# Patient Record
Sex: FEMALE | Race: WHITE | Employment: FULL TIME | URBAN - METROPOLITAN AREA
[De-identification: names, ages, dates, MRNs, and addresses within clinical notes are randomized per-mention and may not be internally consistent; named-entity substitution may affect disease eponyms.]

---

## 2017-10-09 RX ORDER — PHENYTOIN SODIUM 100 MG/1
CAPSULE, EXTENDED RELEASE ORAL
Qty: 40 CAPSULE | Refills: 0 | Status: SHIPPED | OUTPATIENT
Start: 2017-10-09 | End: 2018-01-17 | Stop reason: SDUPTHER

## 2017-10-15 PROBLEM — G40.909 SEIZURE DISORDER (HCC): Status: ACTIVE | Noted: 2017-10-15

## 2017-10-16 ENCOUNTER — OFFICE VISIT (OUTPATIENT)
Dept: NEUROLOGY | Age: 62
End: 2017-10-16
Payer: COMMERCIAL

## 2017-10-16 VITALS
HEIGHT: 66 IN | WEIGHT: 205.8 LBS | SYSTOLIC BLOOD PRESSURE: 113 MMHG | DIASTOLIC BLOOD PRESSURE: 65 MMHG | HEART RATE: 70 BPM | BODY MASS INDEX: 33.07 KG/M2

## 2017-10-16 DIAGNOSIS — G40.909 SEIZURE DISORDER (HCC): Primary | ICD-10-CM

## 2017-10-16 PROCEDURE — 99213 OFFICE O/P EST LOW 20 MIN: CPT | Performed by: NURSE PRACTITIONER

## 2017-10-16 NOTE — PROGRESS NOTES
[facility] NEUROLOGY SPECIALIST  [facility] [id number]  Dept: [phone]  Dept Fax: [phone]  Dillan Chavez CNP    10/16/2017    HPI:      Your patient, Bharathi Dos Santos returns for continuing neurologic care for seizure disorder. Patient is a 72-year-old woman seen in the past by Dr. Tevin Howard for seizure disorder. Patient was last seen in September 2016. 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 She has had no seizures,with her last seizure being in 1989. She tolerates Dilantin well she currently takes 200 mg twice daily.      Testing:  -MRI of the brain normal  -EEG normal      Past Medical History:   Diagnosis Date    Allergic rhinitis 5/26/2015    Hypersomnia with sleep apnea, unspecified     Poison ivy 5/26/2015    Seizures ([address]     Vitreous floaters          Past Surgical History:   Procedure Laterality Date    TONSILLECTOMY      TUBAL LIGATION         Family History   Problem Relation Age of Onset    Hypertension Mother     Hypertension Father    [facility] Lupus Sister        Social History   Substance Use Topics    Smoking status: Former Smoker     Quit date: 11/13/1984    Smokeless tobacco: Never Used      Comment: was a social smoker,  was a smoker, he quit 5 years ago    Alcohol use Yes      Comment: occasionally                               REVIEW OF SYSTEMS    CONSTITUTIONAL Weight: absent, Appetite: absent, Fatigue: absent      HEENT Ears: normal, Eyes: glasses, Visual disturbance: absent   RESPIRATORY Shortness of breath: absent, Cough: absent   CARDIOVASCULAR Chest pain: absent, Leg swelling :absent      GI Constipation: absent, Diarrhea: absent, Swallowing change: absent       Urinary frequency: absent, Urinary urgency: absent, Urinary incontinence: absent   MUSCULOSKELETAL Neck pain: absent, Back pain: absent, Stiffness: absent, Muscle pain: absent, Joint pain: absent Restless legs: absent   DERMATOLOGIC Hair loss: absent, Skin changes: absent   NEUROLOGIC Memory loss: absent, Confusion: absent, Seizures: present Trouble walking or imbalance: absent, Dizziness: absent, Weakness: absent, Numbness: absent Tremor: absent, Spasm: absent, Speech difficulty: absent, Headache: absent, Light sensitivity: absent   PSYCHIATRIC Anxiety: absent, Hallucination: absent, Mood disorder: absent   HEMATOLOGIC Abnormal bleeding: absent, Anemia: absent, Clotting disorder: absent, Lymph gland changes: absent           Allergies   Allergen Reactions    Pollen Extract      sneezing             Current Outpatient Prescriptions   Medication Sig Dispense Refill    DILANTIN 100 MG ER capsule TAKE 2 CAPSULES TWICE A DAY - MUST BE BRAND NAME ONLY 40 capsule 0    Multiple Vitamin (MULTIVITAMIN PO) Take  by mouth daily. No current facility-administered medications for this visit. PHYSICAL EXAMINATION       There were no vitals filed for this visit.                                            .                                                                                                    General Appearance:  Alert, cooperative, no signs of distress, appears stated age   Head:  Normocephalic, no signs of trauma   Eyes:  Conjunctiva/corneas clear;  eyelids intact   Ears:  Normal external ear and canals   Nose: Nares normal, mucosa normal, no drainage    Throat: Lips and tongue normal; teeth normal;  gums normal   Neck: Supple, intact flexion, extension and rotation;   trachea midline;  no adenopathy;   thyroid: not enlarged;   no carotid pulse abnormality   Back:   Symmetric, no curvature, ROM adequate   Lungs:   Respirations unlabored   Heart:  Regular rate and rhythm           Extremities: Extremities normal, no cyanosis, no edema   Pulses: Symmetric over head and neck   Skin: Skin color, texture normal, no rashes, no lesions                                             NEUROLOGIC EXAMINATION    Neurologic Exam     Mental Status   Oriented to person, place, and time. Attention: normal.   Speech: speech is normal   Level of consciousness: alert  Normal comprehension. Cranial Nerves     CN II   Visual fields full to confrontation. CN III, IV, VI   Pupils are equal, round, and reactive to light. Extraocular motions are normal.     CN V   Facial sensation intact. CN VII   Facial expression full, symmetric. CN VIII   CN VIII normal.     CN IX, X   CN IX normal.     CN XI   CN XI normal.     CN XII   CN XII normal.     Motor Exam   Muscle bulk: normal  Overall muscle tone: normal  Right arm pronator drift: absent    Strength   Strength 5/5 throughout. Sensory Exam   Light touch normal.   Vibration normal.   Pinprick normal.     Gait, Coordination, and Reflexes     Gait  Gait: normal    Coordination   Finger to nose coordination: normal    Tremor   Resting tremor: absent    Reflexes   Right brachioradialis: 2+  Left brachioradialis: 2+  Right biceps: 2+  Left biceps: 2+  Right triceps: 2+  Left triceps: 2+  Right patellar: 2+  Left patellar: 2+  Right achilles: 2+  Left achilles: 2+  Right plantar: normal  Left plantar: normal            ASSESSMENT/PLAN:       In summary, your patient, Ankit Green exhibits the following, with associated plan:    1. Idiopathic epilepsy, well controlled with Dilantin 200 mg twice daily  1. Continue Dilantin 200 mg twice daily  2. We will obtain a Dilantin level  3.  Patient will return in follow-up in 1 year, unless problems arise            Signed: Rhonda Arriaga CNP

## 2017-10-16 NOTE — LETTER
Urinary frequency: absent, Urinary urgency: absent, Urinary incontinence: absent   MUSCULOSKELETAL Neck pain: absent, Back pain: absent, Stiffness: absent, Muscle pain: absent, Joint pain: absent Restless legs: absent   DERMATOLOGIC Hair loss: absent, Skin changes: absent   NEUROLOGIC Memory loss: absent, Confusion: absent, Seizures: present Trouble walking or imbalance: absent, Dizziness: absent, Weakness: absent, Numbness: absent Tremor: absent, Spasm: absent, Speech difficulty: absent, Headache: absent, Light sensitivity: absent   PSYCHIATRIC Anxiety: absent, Hallucination: absent, Mood disorder: absent   HEMATOLOGIC Abnormal bleeding: absent, Anemia: absent, Clotting disorder: absent, Lymph gland changes: absent           Allergies   Allergen Reactions    Pollen Extract      sneezing             Current Outpatient Prescriptions   Medication Sig Dispense Refill    DILANTIN 100 MG ER capsule TAKE 2 CAPSULES TWICE A DAY - MUST BE BRAND NAME ONLY 40 capsule 0    Multiple Vitamin (MULTIVITAMIN PO) Take  by mouth daily. No current facility-administered medications for this visit. PHYSICAL EXAMINATION       There were no vitals filed for this visit.                                            .                                                                                                    General Appearance:  Alert, cooperative, no signs of distress, appears stated age   Head:  Normocephalic, no signs of trauma   Eyes:  Conjunctiva/corneas clear;  eyelids intact   Ears:  Normal external ear and canals   Nose: Nares normal, mucosa normal, no drainage    Throat: Lips and tongue normal; teeth normal;  gums normal   Neck: Supple, intact flexion, extension and rotation;   trachea midline;  no adenopathy;   thyroid: not enlarged;   no carotid pulse abnormality   Back:   Symmetric, no curvature, ROM adequate   Lungs:   Respirations unlabored Claribel Meek, CNP

## 2018-01-16 DIAGNOSIS — G40.909 SEIZURE DISORDER (HCC): Primary | ICD-10-CM

## 2018-01-16 RX ORDER — PHENYTOIN SODIUM 100 MG/1
CAPSULE, EXTENDED RELEASE ORAL
Qty: 120 CAPSULE | Refills: 5 | Status: SHIPPED | OUTPATIENT
Start: 2018-01-16 | End: 2018-01-17 | Stop reason: CLARIF

## 2018-01-18 RX ORDER — PHENYTOIN SODIUM 100 MG/1
200 CAPSULE, EXTENDED RELEASE ORAL 2 TIMES DAILY
Qty: 40 CAPSULE | Refills: 0 | Status: SHIPPED | OUTPATIENT
Start: 2018-01-18 | End: 2018-10-16 | Stop reason: SDUPTHER

## 2018-01-18 RX ORDER — PHENYTOIN SODIUM 100 MG/1
200 CAPSULE, EXTENDED RELEASE ORAL 2 TIMES DAILY
Qty: 360 CAPSULE | Refills: 1 | Status: SHIPPED | OUTPATIENT
Start: 2018-01-18 | End: 2018-07-22 | Stop reason: SDUPTHER

## 2018-07-22 DIAGNOSIS — G40.909 SEIZURE DISORDER (HCC): ICD-10-CM

## 2018-07-23 RX ORDER — PHENYTOIN SODIUM 100 MG/1
CAPSULE, EXTENDED RELEASE ORAL
Qty: 360 CAPSULE | Refills: 0 | Status: SHIPPED | OUTPATIENT
Start: 2018-07-23 | End: 2019-04-23 | Stop reason: SDUPTHER

## 2018-10-16 ENCOUNTER — OFFICE VISIT (OUTPATIENT)
Dept: NEUROLOGY | Age: 63
End: 2018-10-16
Payer: COMMERCIAL

## 2018-10-16 VITALS
DIASTOLIC BLOOD PRESSURE: 75 MMHG | HEART RATE: 75 BPM | BODY MASS INDEX: 33.55 KG/M2 | HEIGHT: 65 IN | WEIGHT: 201.4 LBS | SYSTOLIC BLOOD PRESSURE: 117 MMHG

## 2018-10-16 DIAGNOSIS — G40.909 SEIZURE DISORDER (HCC): ICD-10-CM

## 2018-10-16 PROCEDURE — 1036F TOBACCO NON-USER: CPT | Performed by: NURSE PRACTITIONER

## 2018-10-16 PROCEDURE — G8484 FLU IMMUNIZE NO ADMIN: HCPCS | Performed by: NURSE PRACTITIONER

## 2018-10-16 PROCEDURE — G8417 CALC BMI ABV UP PARAM F/U: HCPCS | Performed by: NURSE PRACTITIONER

## 2018-10-16 PROCEDURE — 3017F COLORECTAL CA SCREEN DOC REV: CPT | Performed by: NURSE PRACTITIONER

## 2018-10-16 PROCEDURE — 99213 OFFICE O/P EST LOW 20 MIN: CPT | Performed by: NURSE PRACTITIONER

## 2018-10-16 PROCEDURE — G8427 DOCREV CUR MEDS BY ELIG CLIN: HCPCS | Performed by: NURSE PRACTITIONER

## 2018-10-16 RX ORDER — PHENYTOIN SODIUM 100 MG/1
200 CAPSULE, EXTENDED RELEASE ORAL 2 TIMES DAILY
Qty: 360 CAPSULE | Refills: 3 | Status: SHIPPED | OUTPATIENT
Start: 2018-10-16 | End: 2019-04-22 | Stop reason: SDUPTHER

## 2018-10-16 NOTE — PROGRESS NOTES
occasionally                               REVIEW OF SYSTEMS    CONSTITUTIONAL Weight: absent, Appetite: absent, Fatigue: absent      HEENT Ears: normal, Visual disturbance: absent   RESPIRATORY Shortness of breath: absent, Cough: absent   CARDIOVASCULAR Chest pain: absent, Leg swelling :absent      GI Constipation: absent, Diarrhea: absent, Swallowing change: absent       Urinary frequency: absent, Urinary urgency: absent, Urinary incontinence: absent   MUSCULOSKELETAL Neck pain: absent, Back pain: absent, Stiffness: absent, Muscle pain: absent, Joint pain: absent Restless legs: absent   DERMATOLOGIC Hair loss: absent, Skin changes: absent   NEUROLOGIC Memory loss: absent, Confusion: absent, Seizures: absent Trouble walking or imbalance: absent, Dizziness: absent, Weakness: absent, Numbness: absent Tremor: absent, Spasm: absent, Speech difficulty: absent, Headache: absent, Light sensitivity: absent   PSYCHIATRIC Anxiety: absent, Hallucination: absent, Mood disorder: absent   HEMATOLOGIC Abnormal bleeding: absent, Anemia: absent, Clotting disorder: absent, Lymph gland changes: absent           Allergies   Allergen Reactions    Pollen Extract      sneezing             Current Outpatient Prescriptions   Medication Sig Dispense Refill    phenytoin (DILANTIN) 100 MG ER capsule Take 2 capsules by mouth 2 times daily MUST BE BRAND NAME ONLY, DO NOT FILL GENERIC DUE TO INTOLERANCE. 360 capsule 3    DILANTIN 100 MG ER capsule TAKE 2 CAPSULES 2 TIMES DAILY 360 capsule 0    Multiple Vitamin (MULTIVITAMIN PO) Take  by mouth daily. No current facility-administered medications for this visit. PHYSICAL EXAMINATION       /75 (Site: Left Upper Arm, Position: Sitting)   Pulse 75   Ht 5' 5\" (1.651 m)   Wt 201 lb 6.4 oz (91.4 kg)   BMI 33.51 kg/m²                                             . General Appearance:  Alert, cooperative, no signs of distress, appears stated age   Head:  Normocephalic, no signs of trauma   Eyes:  Conjunctiva/corneas clear;  eyelids intact   Ears:  Normal external ear and canals   Nose: Nares normal, mucosa normal, no drainage    Throat: Lips and tongue normal; teeth normal;  gums normal   Neck: Supple, intact flexion, extension and rotation;   trachea midline;  no adenopathy;   thyroid: not enlarged;   no carotid pulse abnormality   Back:   Symmetric, no curvature, ROM adequate   Lungs:   Respirations unlabored   Heart:  Regular rate and rhythm           Extremities: Extremities normal, no cyanosis, no edema   Pulses: Symmetric over head and neck   Skin: Skin color, texture normal, no rashes, no lesions                                             NEUROLOGIC EXAMINATION    Neurologic Exam          ASSESSMENT/PLAN:       In summary, your patient, Jolynn Velazco exhibits the following, with associated plan:    1. Partial onset seizure disorder with secondary generalization. Patient has been seizure-free since 1989.  1. Continue Dilantin 200 mg twice daily  2. Obtain phenytoin level  3. Obtain ALT, AST, and CBC  4.  Continue to return on an annual basis            Signed: Jillian Dean CNP      *Please note that portions of this note were completed with a voice recognition program.  Although every effort was made to insure the accuracy of this automated transcription, some errors in transcription may have occurred, occasionally words and are mis-transcribed

## 2019-04-22 RX ORDER — PHENYTOIN SODIUM 100 MG/1
200 CAPSULE, EXTENDED RELEASE ORAL 2 TIMES DAILY
Qty: 360 CAPSULE | Refills: 1 | Status: SHIPPED | OUTPATIENT
Start: 2019-04-22 | End: 2019-04-23 | Stop reason: CLARIF

## 2019-04-23 DIAGNOSIS — G40.909 SEIZURE DISORDER (HCC): ICD-10-CM

## 2019-04-23 RX ORDER — PHENYTOIN SODIUM 100 MG/1
CAPSULE, EXTENDED RELEASE ORAL
Qty: 360 CAPSULE | Refills: 0 | Status: SHIPPED | OUTPATIENT
Start: 2019-04-23 | End: 2019-07-11 | Stop reason: SDUPTHER

## 2019-04-23 NOTE — TELEPHONE ENCOUNTER
Wesley Barrera called in. The RX was sent as Phenytoin, even though it said Name Brand and Dilantin was in parenthesis the pharmacy won't honor it as Dilantin. We have to resend it through.   She said if it requires a prior auth the number is [phone]

## 2019-07-11 DIAGNOSIS — G40.909 SEIZURE DISORDER (HCC): ICD-10-CM

## 2019-07-11 RX ORDER — PHENYTOIN SODIUM 100 MG/1
CAPSULE, EXTENDED RELEASE ORAL
Qty: 360 CAPSULE | Refills: 0 | Status: SHIPPED | OUTPATIENT
Start: 2019-07-22 | End: 2019-10-16 | Stop reason: SDUPTHER

## 2019-10-16 ENCOUNTER — HOSPITAL ENCOUNTER (OUTPATIENT)
Age: 64
Setting detail: SPECIMEN
Discharge: HOME | End: 2019-10-16
Payer: COMMERCIAL

## 2019-10-16 ENCOUNTER — OFFICE VISIT (OUTPATIENT)
Dept: NEUROLOGY | Age: 64
End: 2019-10-16
Payer: COMMERCIAL

## 2019-10-16 VITALS
WEIGHT: 194.2 LBS | SYSTOLIC BLOOD PRESSURE: 113 MMHG | DIASTOLIC BLOOD PRESSURE: 72 MMHG | HEART RATE: 66 BPM | HEIGHT: 66 IN | BODY MASS INDEX: 31.21 KG/M2

## 2019-10-16 DIAGNOSIS — G40.909 SEIZURE DISORDER (HCC): ICD-10-CM

## 2019-10-16 LAB
ALT SERPL-CCNC: 19 U/L (ref 5–33)
AST SERPL-CCNC: 20 U/L
HCT VFR BLD CALC: 43.7 % (ref 36.3–47.1)
HEMOGLOBIN: 13.6 G/DL (ref 11.9–15.1)
MCH RBC QN AUTO: 30.7 PG (ref 25.2–33.5)
MCHC RBC AUTO-ENTMCNC: 31.1 G/DL (ref 28.4–34.8)
MCV RBC AUTO: 98.6 FL (ref 82.6–102.9)
NRBC AUTOMATED: 0 PER 100 WBC
PDW BLD-RTO: 13 % (ref 11.8–14.4)
PHENYTOIN DATE LAST DOSE: ABNORMAL
PHENYTOIN DOSE AMOUNT: ABNORMAL
PHENYTOIN DOSE TIME: 730
PHENYTOIN FREE: 1.4 UG/ML (ref 1–2)
PHENYTOIN LEVEL: 20.5 UG/ML (ref 10–20)
PLATELET # BLD: 275 K/UL (ref 138–453)
PMV BLD AUTO: 10.9 FL (ref 8.1–13.5)
RBC # BLD: 4.43 M/UL (ref 3.95–5.11)
WBC # BLD: 5.1 K/UL (ref 3.5–11.3)

## 2019-10-16 PROCEDURE — 1036F TOBACCO NON-USER: CPT | Performed by: NURSE PRACTITIONER

## 2019-10-16 PROCEDURE — G8417 CALC BMI ABV UP PARAM F/U: HCPCS | Performed by: NURSE PRACTITIONER

## 2019-10-16 PROCEDURE — G8484 FLU IMMUNIZE NO ADMIN: HCPCS | Performed by: NURSE PRACTITIONER

## 2019-10-16 PROCEDURE — G8427 DOCREV CUR MEDS BY ELIG CLIN: HCPCS | Performed by: NURSE PRACTITIONER

## 2019-10-16 PROCEDURE — 99213 OFFICE O/P EST LOW 20 MIN: CPT | Performed by: NURSE PRACTITIONER

## 2019-10-16 PROCEDURE — 3017F COLORECTAL CA SCREEN DOC REV: CPT | Performed by: NURSE PRACTITIONER

## 2019-10-16 RX ORDER — PHENYTOIN SODIUM 100 MG/1
CAPSULE, EXTENDED RELEASE ORAL
Qty: 360 CAPSULE | Refills: 2 | Status: SHIPPED | OUTPATIENT
Start: 2019-10-16 | End: 2020-06-29

## 2020-06-29 RX ORDER — PHENYTOIN SODIUM 100 MG/1
CAPSULE, EXTENDED RELEASE ORAL
Qty: 360 CAPSULE | Refills: 2 | Status: SHIPPED | OUTPATIENT
Start: 2020-06-29 | End: 2021-02-19

## 2020-06-29 NOTE — TELEPHONE ENCOUNTER
Pharmacy requesting refill of Dilantin .       Medication active on med list yes      Date of last prescription 10/16/19 for 90 d  with 2 refills verified on 6/29/2020   verified by HANNAH FERGUSON      Date of last appointment 10/16/2019    Next Visit Date:  10/15/2020

## 2020-11-11 ENCOUNTER — OFFICE VISIT (OUTPATIENT)
Dept: NEUROLOGY | Age: 65
End: 2020-11-11
Payer: COMMERCIAL

## 2020-11-11 ENCOUNTER — HOSPITAL ENCOUNTER (OUTPATIENT)
Age: 65
Setting detail: SPECIMEN
Discharge: HOME | End: 2020-11-11
Payer: COMMERCIAL

## 2020-11-11 VITALS
DIASTOLIC BLOOD PRESSURE: 64 MMHG | WEIGHT: 182 LBS | BODY MASS INDEX: 30.32 KG/M2 | HEIGHT: 65 IN | SYSTOLIC BLOOD PRESSURE: 104 MMHG | HEART RATE: 74 BPM | TEMPERATURE: 96.6 F

## 2020-11-11 LAB
ALT SERPL-CCNC: 15 U/L (ref 5–33)
AST SERPL-CCNC: 18 U/L
PHENYTOIN DATE LAST DOSE: ABNORMAL
PHENYTOIN DOSE AMOUNT: ABNORMAL
PHENYTOIN DOSE TIME: ABNORMAL
PHENYTOIN FREE: 0.7 UG/ML (ref 1–2)
PHENYTOIN LEVEL: 11.6 UG/ML (ref 10–20)
VITAMIN D 25-HYDROXY: 57.1 NG/ML (ref 30–100)

## 2020-11-11 PROCEDURE — G8484 FLU IMMUNIZE NO ADMIN: HCPCS | Performed by: NURSE PRACTITIONER

## 2020-11-11 PROCEDURE — G8427 DOCREV CUR MEDS BY ELIG CLIN: HCPCS | Performed by: NURSE PRACTITIONER

## 2020-11-11 PROCEDURE — 99214 OFFICE O/P EST MOD 30 MIN: CPT | Performed by: NURSE PRACTITIONER

## 2020-11-11 PROCEDURE — G8417 CALC BMI ABV UP PARAM F/U: HCPCS | Performed by: NURSE PRACTITIONER

## 2020-11-11 PROCEDURE — 3017F COLORECTAL CA SCREEN DOC REV: CPT | Performed by: NURSE PRACTITIONER

## 2020-11-11 PROCEDURE — 1036F TOBACCO NON-USER: CPT | Performed by: NURSE PRACTITIONER

## 2020-11-11 RX ORDER — IBUPROFEN 400 MG/1
400 TABLET ORAL EVERY 6 HOURS PRN
COMMUNITY

## 2020-11-11 NOTE — PROGRESS NOTES
[facility]            [address]          [place], [address] [facility]          Dept: [phone]          Dept Fax: [phone]        MD Annabelle Chandler MD Ahmed B. Bianca Carl, MD Lory Persia, MD Marvia Agar, MD Ted Bel, CNP            11/11/2020      HISTORY OF PRESENT ILLNESS:       I had the pleasure of seeing Higinio Balderas, who returns for continuing neurologic care. Patient is a 73-year-old woman who was seen last on October 16, 2019 for management of a partial onset seizure disorder with secondary generalization. Patient has been seen in the past by Dr. Mis Whitlock and has been seizure-free since 1989. She began having seizures as a child and has been treated with Dilantin since the onset of her seizure disorder. The patient currently takes Dilantin 200 mg twice daily and tolerates the medication without side effects. Patient had laboratory testing ordered at her last visit 1 year ago and did not follow through with her testing. It was 2 years prior to that that she had laboratory testing done. Previous testing includes an MRI of the brain which was normal as well as an EEG which was also normal.  The patient is here today for reevaluation and has been seizure-free since her last visit. She denies any dizziness, blurred vision, or unsteady gait. She denies any nausea all of which could be side effects of Dilantin toxicity. At today's visit, she reports that she has still been seizure free. She reported a fall where she broke her leg in May 2020, she was then informed that dilantin can cause osteoporosis. . Patient reported that she is terrified of stopping dilantin because the last time she stopped taking seizure medication she had multiple seizures. She has been taking dilantin ever since.  She denies any side effects other than osteopenia, she has been taking dilantin for over 50 years.          Prior testing reviewed:    10/13/20 osteopenia bone mineral density by the [facility] criteria(using T-scores) measured in the femoral necks          PAST MEDICAL HISTORY:         Diagnosis Date    Allergic rhinitis 2015    Hypersomnia with sleep apnea, unspecified     Poison ivy 2015    Seizures ([address]     Vitreous floaters         PAST SURGICAL HISTORY:         Procedure Laterality Date    TONSILLECTOMY      TUBAL LIGATION          SOCIAL HISTORY:     Social History     Socioeconomic History    Marital status:      Spouse name: Not on file    Number of children: Not on file    Years of education: Not on file    Highest education level: Not on file   Occupational History    Not on file   Social Needs    Financial resource strain: Not on file    Food insecurity     Worry: Not on file     Inability: Not on file    Transportation needs     Medical: Not on file     Non-medical: Not on file   Tobacco Use    Smoking status: Former Smoker     Last attempt to quit: 1984     Years since quittin.0    Smokeless tobacco: Never Used    Tobacco comment: was a social smoker,  was a smoker, he quit 5 years ago   Substance and Sexual Activity    Alcohol use: Yes     Comment: occasionally    Drug use: No    Sexual activity: Not on file   Lifestyle    Physical activity     Days per week: Not on file     Minutes per session: Not on file    Stress: Not on file   Relationships    Social connections     Talks on phone: Not on file     Gets together: Not on file     Attends [employer] service: Not on file     Active member of club or organization: Not on file     Attends meetings of clubs or organizations: Not on file     Relationship status: Not on file    Intimate partner violence     Fear of current or ex partner: Not on file     Emotionally abused: Not on file     Physically abused: Not on file     Forced sexual activity: Not on file   Other Topics Concern    Not on file   Social History Narrative    Not on file       CURRENT MEDICATIONS:     Current Outpatient Medications   Medication Sig Dispense Refill    ibuprofen (ADVIL;MOTRIN) 400 MG tablet Take 400 mg by mouth every 6 hours as needed for Pain      VITAMIN D PO Take 1,000 mg by mouth daily      DILANTIN 100 MG ER capsule TAKE 2 CAPSULES BY MOUTH  TWO TIMES DAILY 360 capsule 2    Calcium Citrate 200 MG TABS Take 400 mg by mouth 3 times daily      Multiple Vitamin (MULTIVITAMIN PO) Take  by mouth daily. No current facility-administered medications for this visit. ALLERGIES:     Allergies   Allergen Reactions    Pollen Extract      sneezing                                   REVIEW OF SYSTEMS        All items selected indicate a positive finding. Those items not selected are negative.   Constitutional [] Weight loss/gain   [] Fatigue  [] Fever/Chills   HEENT [] Hearing Loss  [] Visual Disturbance  [] Tinnitus  [] Eye pain   Respiratory [] Shortness of Breath  [] Cough  [] Snoring   Cardiovascular [] Chest Pain  [] Palpitations  [] Lightheaded   GI [] Constipation  [] Diarrhea  [] Swallowing change  [] Nausea/vomiting    [] Urinary Frequency  [] Urinary Urgency   Musculoskeletal [] Neck pain  [] Back pain  [] Muscle pain  [] Restless legs   Dermatologic [] Skin changes   Neurologic [] Memory loss/confusion  [x] Seizures  [x] Trouble walking or imbalance  [] Dizziness  [] Sleep disturbance  [] Weakness  [] Numbness  [] Tremors  [] Speech Difficulty  [] Headaches  [] Light Sensitivity  [] Sound Sensitivity   Endocrinology []Excessive thirst  []Excessive hunger   Psychiatric [] Anxiety/Depression  [] Hallucination   Allergy/immunology []Hives/environmental allergies   Hematologic/lymph [] Abnormal bleeding  [] Abnormal bruising         PHYSICAL EXAMINATION:       Vitals:    11/11/20 1144   BP: 104/64   Pulse: 74   Temp: 96.6 °F (35.9 °C) .                                                                                                    General Appearance:  Alert, cooperative, no signs of distress, appears stated age   Head:  Normocephalic, no signs of trauma   Eyes:  Conjunctiva/corneas clear;  eyelids intact   Ears:  Normal external ear and canals   Nose: Nares normal, mucosa normal, no drainage    Throat: Lips and tongue normal; teeth normal;  gums normal   Neck: Supple, intact flexion, extension and rotation;   trachea midline;  no adenopathy;   thyroid: not enlarged;   no carotid pulse abnormality   Back:   Symmetric, no curvature, ROM adequate   Lungs:   Respirations unlabored   Heart:  Regular rate and rhythm           Extremities: Extremities normal, no cyanosis, no edema   Pulses: Symmetric over head and neck   Skin: Skin color, texture normal, no rashes, no lesions                                     NEUROLOGIC EXAMINATION    Neurologic Exam  Mental status    Alert and oriented x 3; intact memory with no confusion, speech or language problems; no hallucinations or delusions  Fund of information appropriate for level of education    Cranial nerves    II - visual fields intact to confrontation bilaterally  III, IV, VI - extra-ocular muscles full: no pupillary defect; no CANDACE, no nystagmus, no ptosis   V - normal facial sensation                                                               VII - normal facial symmetry                                                             VIII - intact hearing                                                                             IX, X - symmetrical palate                                                                  XI - symmetrical shoulder shrug                                                       XII - tongue midline without atrophy or fasciculation      Motor function  Normal muscle bulk and tone; strength 5/5 on all 4 extremities, no pronator drift      Sensory function Intact to light touch, pinprick, vibration, proprioception on all 4 extremities      Cerebellar Intact fine motor movement. No involuntary movements or tremors. No ataxia or dysmetria on finger to nose or heel to shin testing      Reflex function DTR 2+ on bilateral UE and LE, symmetric. Negative Babinski      Gait                   normal base and arm swing                  ASSESSMENT AND PLAN:           In summary, your patient, Luis Menon exhibits the following, with associated plan:    1. Partial onset seizure disorder with secondary generalization. Patient has been seizure-free since 1989. This is the time when she was weaned from Dilantin because she had remained seizure-free. Discussed other long-term side effects of Dilantin including gum disease, osteoporosis, and ataxia. The patient verbalized understanding, but would like to continue on this medication as she has been treated for over 50 years without any difficulties. 1. Continue Dilantin 200 mg twice daily  2. Follow through with Dilantin level, ALT, AST, and Vit D.    3. The patient will be contacted with her laboratory results  4. She will otherwise return on an annual basis. Signed: Trisha Lemus CNP      *Please note that portions of this note were completed with a voice recognition program.  Although every effort was made to insure the accuracy of this automated transcription, some errors in transcription may have occurred, occasionally words and are mis-transcribed    Scribe Attestation:   By signing my name below, I, Mikel Lloyd, attest that this documentation has been prepared under the direction and in the presence of Trisha Lemus CNP.

## 2020-12-31 PROBLEM — S82.142A CLOSED BICONDYLAR FRACTURE OF LEFT TIBIAL PLATEAU: Status: ACTIVE | Noted: 2020-05-16

## 2020-12-31 PROBLEM — M85.80 OSTEOPENIA: Status: ACTIVE | Noted: 2020-05-29

## 2020-12-31 PROBLEM — S92.416A CLOSED NONDISPLACED FRACTURE OF PROXIMAL PHALANX OF GREAT TOE: Status: ACTIVE | Noted: 2020-05-16

## 2021-02-18 DIAGNOSIS — G40.909 SEIZURE DISORDER (HCC): ICD-10-CM

## 2021-02-19 RX ORDER — PHENYTOIN SODIUM 100 MG/1
CAPSULE, EXTENDED RELEASE ORAL
Qty: 360 CAPSULE | Refills: 1 | Status: SHIPPED | OUTPATIENT
Start: 2021-02-19 | End: 2021-11-02

## 2021-02-19 NOTE — TELEPHONE ENCOUNTER
Pharmacy requesting refill of Dilantin.       Medication active on med list: yes      Date of last fill: 6/29/20 for #360 2 refills  verified on 2/19/2021    verified by Luis Lloyd LPN      Date of last appointment 11/11/2020    Next Visit Date:  11/10/2021

## 2021-11-01 DIAGNOSIS — G40.909 SEIZURE DISORDER (HCC): ICD-10-CM

## 2021-11-01 NOTE — TELEPHONE ENCOUNTER
Pharmacy requesting a  refill of Dilanitin 100mg.       Medication active on med list yes      Date of last prescription 02/19/2021  with 1 refills verified on 11/1/2021    verified by CHACHO LORA      Date of last appointment 11/11/2020    Next Visit Date:  11/10/2021

## 2021-11-02 RX ORDER — PHENYTOIN SODIUM 100 MG/1
CAPSULE, EXTENDED RELEASE ORAL
Qty: 360 CAPSULE | Refills: 3 | Status: SHIPPED | OUTPATIENT
Start: 2021-11-02 | End: 2022-10-25

## 2021-11-10 ENCOUNTER — OFFICE VISIT (OUTPATIENT)
Dept: NEUROLOGY | Age: 66
End: 2021-11-10
Payer: COMMERCIAL

## 2021-11-10 VITALS
BODY MASS INDEX: 31.49 KG/M2 | HEIGHT: 65 IN | WEIGHT: 189 LBS | HEART RATE: 73 BPM | TEMPERATURE: 97 F | SYSTOLIC BLOOD PRESSURE: 111 MMHG | DIASTOLIC BLOOD PRESSURE: 66 MMHG

## 2021-11-10 DIAGNOSIS — G40.909 SEIZURE DISORDER (HCC): ICD-10-CM

## 2021-11-10 PROCEDURE — G8427 DOCREV CUR MEDS BY ELIG CLIN: HCPCS | Performed by: NURSE PRACTITIONER

## 2021-11-10 PROCEDURE — G8484 FLU IMMUNIZE NO ADMIN: HCPCS | Performed by: NURSE PRACTITIONER

## 2021-11-10 PROCEDURE — G8417 CALC BMI ABV UP PARAM F/U: HCPCS | Performed by: NURSE PRACTITIONER

## 2021-11-10 PROCEDURE — 3017F COLORECTAL CA SCREEN DOC REV: CPT | Performed by: NURSE PRACTITIONER

## 2021-11-10 PROCEDURE — 1036F TOBACCO NON-USER: CPT | Performed by: NURSE PRACTITIONER

## 2021-11-10 PROCEDURE — 99214 OFFICE O/P EST MOD 30 MIN: CPT | Performed by: NURSE PRACTITIONER

## 2021-11-10 NOTE — PROGRESS NOTES
[facility]            [address]          [place], [address] [facility]          Dept: [phone]          Dept Fax: [phone]    MD Len Cunningham MD Ahmed B. Veneta Levels, MD Sinclair Galt, MD Gertie Child, CNP            11/10/2021      HISTORY OF PRESENT ILLNESS:       I had the pleasure of seeing Shalini Brewer, who returns for continuing neurologic care. 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 For seizure prophylaxis she is prescribed dilantin 200 mg twice daily. She has been seizure free since 1989. She had a dilantin level ordered in November 2020 which showed a subtherapeutic free dilantin level at 0.7. She was recommended to have a repeat dilantin level in one month but she did not. She also completed an ALT, AST and vitamin D level in November 2020 which all returned normal. She is here today reporting that she has remained compliant to dilantin and has remained seizure free since her last visit. She has been diagnosed with osteoporosis and notes that she is being treated for it.      Testing reviewed:    Labs Completed 11/11/2020  Phenytoin Lvl 10.0 - 20.0 ug/mL 11.6     Phenytoin, Free 1.0 - 2.0 ug/mL 0.7 Low       Vit D, 25-Hydroxy 30.0 - 100.0 ng/mL 57.1      AST <32 U/L 18     ALT 5 - 33 U/L 15          10/13/20 osteopenia bone mineral density by the [facility] criteria(using T-scores) measured in the femoral necks      PAST MEDICAL HISTORY:         Diagnosis Date    Allergic rhinitis 5/26/2015    Hypersomnia with sleep apnea, unspecified     Poison ivy 5/26/2015    Seizures ([address]     Vitreous floaters         PAST SURGICAL HISTORY:         Procedure Laterality Date    LEG SURGERY  05/16/2020    Left leg surgery- broken leg, has plates and screws    extremities, no pronator drift      Sensory function Intact to light touch, pinprick, vibration, proprioception on all 4 extremities      Cerebellar Intact fine motor movement. No involuntary movements or tremors. No ataxia or dysmetria on finger to nose or heel to shin testing      Reflex function DTR 2+ on bilateral UE and LE, symmetric. Down going toes bilaterally      Gait                   normal base and arm swing                  Medical Decision Making: In summary, your patient, Roel Dee exhibits the following, with associated plan:    1. Partial onset seizure disorder with secondary generalization.  Patient has been seizure-free since 1989.  This is the time when she was weaned from Dilantin because she had remained seizure-free. Discussed other long-term side effects of Dilantin including gum disease, osteoporosis, and ataxia. The patient verbalized understanding, but would like to continue on this medication as she has been treated for over 50 years without any difficulties. 1. Continue Dilantin 200 mg twice daily  2. Obtain repeat Dilantin level, ALT, AST  3. She will continue to return on an annual basis. Signed: Kimberly Edward CNP      *Please note that portions of this note were completed with a voice recognition program.  Although every effort was made to insure the accuracy of this automated transcription, some errors in transcription may have occurred, occasionally words and are mis-transcribed    Provider Attestation: The documentation recorded by the scribe accurately reflects the service I personally performed and the decisions made by myself. Portions of this note were transcribed by a scribe. I personally performed the history, physical exam, and medical decision-making and confirm the accuracy of the information in the transcribed note.      Scribe Attestation:   By signing my name below, Kelsey Mars, attest that this documentation has been prepared under the direction and in the presence of Serena Fritz CNP.

## 2021-11-22 DIAGNOSIS — G40.909 SEIZURE DISORDER (HCC): ICD-10-CM

## 2021-11-24 ENCOUNTER — TELEPHONE (OUTPATIENT)
Dept: NEUROLOGY | Age: 66
End: 2021-11-24

## 2021-11-24 NOTE — TELEPHONE ENCOUNTER
I called Maria Guadalupe Avendaño back, both her and Soledad Guthrie, her nurse were unavailable. I left the message with Suzy, . She said she will give the message to Maria Guadalupe Avendaño.

## 2021-11-24 NOTE — TELEPHONE ENCOUNTER
Caity with [facility] called in stating she saw pt and wanted to start her on Fosamax. Pt was worried that it would interact with her Dilantin and cause a seizure. Leena Dowell stated that it would be fine but pt insisted she check with our office. Can you please confirm for this pt as Viridiana Lange is out of office. Thanks.

## 2022-10-22 DIAGNOSIS — G40.909 SEIZURE DISORDER (HCC): ICD-10-CM

## 2022-10-24 NOTE — TELEPHONE ENCOUNTER
Pharmacy requesting refill of Dilantin 100 mg.       Medication active on med list yes      Date of last Rx: 11/2/2021 with 3 refills          verified by STEVE PLATT      Date of last appointment 11/10/2021    Next Visit Date:  Visit date not found

## 2022-10-25 RX ORDER — PHENYTOIN SODIUM 100 MG/1
CAPSULE, EXTENDED RELEASE ORAL
Qty: 360 CAPSULE | Refills: 0 | Status: SHIPPED | OUTPATIENT
Start: 2022-10-25

## 2023-02-08 ENCOUNTER — OFFICE VISIT (OUTPATIENT)
Dept: NEUROLOGY | Age: 68
End: 2023-02-08
Payer: COMMERCIAL

## 2023-02-08 VITALS
HEART RATE: 70 BPM | DIASTOLIC BLOOD PRESSURE: 74 MMHG | HEIGHT: 65 IN | WEIGHT: 197 LBS | SYSTOLIC BLOOD PRESSURE: 128 MMHG | BODY MASS INDEX: 32.82 KG/M2

## 2023-02-08 DIAGNOSIS — G40.909 SEIZURE DISORDER (HCC): Primary | ICD-10-CM

## 2023-02-08 PROCEDURE — G8484 FLU IMMUNIZE NO ADMIN: HCPCS | Performed by: NURSE PRACTITIONER

## 2023-02-08 PROCEDURE — 1090F PRES/ABSN URINE INCON ASSESS: CPT | Performed by: NURSE PRACTITIONER

## 2023-02-08 PROCEDURE — 99213 OFFICE O/P EST LOW 20 MIN: CPT | Performed by: NURSE PRACTITIONER

## 2023-02-08 PROCEDURE — 3017F COLORECTAL CA SCREEN DOC REV: CPT | Performed by: NURSE PRACTITIONER

## 2023-02-08 PROCEDURE — G8400 PT W/DXA NO RESULTS DOC: HCPCS | Performed by: NURSE PRACTITIONER

## 2023-02-08 PROCEDURE — 1036F TOBACCO NON-USER: CPT | Performed by: NURSE PRACTITIONER

## 2023-02-08 PROCEDURE — G8417 CALC BMI ABV UP PARAM F/U: HCPCS | Performed by: NURSE PRACTITIONER

## 2023-02-08 PROCEDURE — 1123F ACP DISCUSS/DSCN MKR DOCD: CPT | Performed by: NURSE PRACTITIONER

## 2023-02-08 PROCEDURE — G8427 DOCREV CUR MEDS BY ELIG CLIN: HCPCS | Performed by: NURSE PRACTITIONER

## 2023-02-08 RX ORDER — PHENYTOIN SODIUM 100 MG/1
CAPSULE, EXTENDED RELEASE ORAL
Qty: 360 CAPSULE | Refills: 3 | Status: SHIPPED | OUTPATIENT
Start: 2023-02-08

## 2023-02-08 NOTE — PROGRESS NOTES
[facility]            [address]          [facility], [address] [facility]          Dept: [phone]          Dept Fax: [phone]    E. Rutha Flood, MD Ahmed B. Terrial Duncans, MD Kimberlee Skillern, MD Calvert Runner, ANITRA            2/8/2023      HISTORY OF PRESENT ILLNESS:       I had the pleasure of seeing Suann Friday, who returns for continuing neurologic care. 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 The patient is a 46-year-old woman with history of partial onset seizures with secondary generalization, not having a seizure since 1989. The patient is currently on Dilantin 200 mg twice daily. She has been diagnosed with osteopenia. At her last visit, and Dilantin level was ordered as well as a metabolic panel and she has not yet had those lab test completed.         Testing reviewed:  Labs Completed 11/11/2020  Phenytoin Lvl 10.0 - 20.0 ug/mL 11.6      Phenytoin, Free 1.0 - 2.0 ug/mL 0.7 Low        Vit D, 25-Hydroxy 30.0 - 100.0 ng/mL 57.1       AST <32 U/L 18      ALT 5 - 33 U/L 15             10/13/20 osteopenia bone mineral density by the [facility] criteria(using T-scores) measured in the femoral necks          PAST MEDICAL HISTORY:         Diagnosis Date    Allergic rhinitis 05/26/2015    Cataract     Osteopenia     Poison ivy 05/26/2015    Seizures ([address]     Vitreous floaters         PAST SURGICAL HISTORY:         Procedure Laterality Date    LEG SURGERY  05/16/2020    Left leg surgery- broken leg, has plates and screws    TONSILLECTOMY      TUBAL LIGATION          SOCIAL HISTORY:     Social History     Socioeconomic History    Marital status:      Spouse name: Not on file    Number of children: Not on file    Years of education: Not on file    Highest education level: Not on file   Occupational History    Not on file   Tobacco Use    Smoking status: Former Types: Cigarettes     Quit date: 1984     Years since quittin.2    Smokeless tobacco: Never    Tobacco comments:     was a social smoker,  was a smoker, he quit 5 years ago   Vaping Use    Vaping Use: Never used   Substance and Sexual Activity    Alcohol use: Yes     Comment: 6 a year    Drug use: No    Sexual activity: Not on file   Other Topics Concern    Not on file   Social History Narrative    Not on file     Social Determinants of Health     Financial Resource Strain: Not on file   Food Insecurity: Not on file   Transportation Needs: Not on file   Physical Activity: Not on file   Stress: Not on file   Social Connections: Not on file   Intimate Partner Violence: Not on file   Housing Stability: Not on file       CURRENT MEDICATIONS:     Current Outpatient Medications   Medication Sig Dispense Refill    alendronate (FOSAMAX) 70 MG tablet TAKE 1 TABLET BY MOUTH  WEEKLY WITH 8 OZ OF PLAIN  WATER 30 MINUTES BEFORE  FIRST FOOD, [address] STAY UPRIGHT FOR 30 MINS      DILANTIN 100 MG ER capsule TAKE 2 CAPSULES BY MOUTH  TWICE DAILY 360 capsule 0    VITAMIN D PO Take 1,000 mg by mouth daily      Calcium Citrate 200 MG TABS Take 400 mg by mouth 3 times daily      Multiple Vitamin (MULTIVITAMIN PO) Take  by mouth daily. ibuprofen (ADVIL;MOTRIN) 400 MG tablet Take 400 mg by mouth every 6 hours as needed for Pain       No current facility-administered medications for this visit. ALLERGIES:     Allergies   Allergen Reactions    Pollen Extract      sneezing                *Review of systems negative at this visit                   REVIEW OF SYSTEMS        All items selected indicate a positive finding. Those items not selected are negative.   Constitutional [] Weight loss/gain   [] Fatigue  [] Fever/Chills   HEENT [] Hearing Loss  [] Visual Disturbance  [] Tinnitus  [] Eye pain   Respiratory [] Shortness of Breath  [] Cough  [] Snoring   Cardiovascular [] Chest Pain  [] Palpitations  [] Lightheaded   GI [] Constipation  [] Diarrhea  [] Swallowing change  [] Nausea/vomiting    [] Urinary Frequency  [] Urinary Urgency   Musculoskeletal [] Neck pain  [] Back pain  [] Muscle pain  [] Restless legs   Dermatologic [] Skin changes   Neurologic [] Memory loss/confusion  [] Seizures  [] Trouble walking or imbalance  [] Dizziness  [] Sleep disturbance  [] Weakness  [] Numbness  [] Tremors  [] Speech Difficulty  [] Headaches  [] Light Sensitivity  [] Sound Sensitivity   Endocrinology []Excessive thirst  []Excessive hunger   Psychiatric [] Anxiety/Depression  [] Hallucination   Allergy/immunology []Hives/environmental allergies   Hematologic/lymph [] Abnormal bleeding  [] Abnormal bruising         PHYSICAL EXAMINATION:       There were no vitals filed for this visit.                                            .                                                                                                    General Appearance:  Alert, cooperative, no signs of distress, appears stated age   Head:  Normocephalic, no signs of trauma   Eyes:  Conjunctiva/corneas clear;  eyelids intact   Ears:  Normal external ear and canals   Nose: Nares normal, mucosa normal, no drainage    Throat: Lips and tongue normal; teeth normal;  gums normal   Neck: Supple, intact flexion, extension and rotation;   trachea midline;  no adenopathy;   thyroid: not enlarged;   no carotid pulse abnormality   Back:   Symmetric, no curvature, ROM adequate   Lungs:   Respirations unlabored   Heart:  Regular rate and rhythm           Extremities: Extremities normal, no cyanosis, no edema   Pulses: Symmetric over head and neck   Skin: Skin color, texture normal, no rashes, no lesions                                     NEUROLOGIC EXAMINATION    Neurologic Exam  Mental status    Alert and oriented x 3; intact memory with no confusion, speech or language problems; no hallucinations or delusions  Fund of information appropriate for level of education    Cranial nerves    II - visual fields intact to confrontation bilaterally  III, IV, VI - extra-ocular muscles full: no pupillary defect; no CANDACE, no nystagmus, no ptosis   V - normal facial sensation                                                               VII - normal facial symmetry                                                             VIII - intact hearing                                                                             IX, X - symmetrical palate                                                                  XI - symmetrical shoulder shrug                                                       XII - tongue midline without atrophy or fasciculation      Motor function  Normal muscle bulk and tone; strength 5/5 on all 4 extremities, no pronator drift      Sensory function Intact to light touch, pinprick, vibration, proprioception on all 4 extremities      Cerebellar Intact fine motor movement. No involuntary movements or tremors. No ataxia or dysmetria on finger to nose or heel to shin testing      Reflex function DTR 2+ on bilateral UE and LE, symmetric. Down going toes bilaterally      Gait                   normal base and arm swing                  Medical Decision Making: In summary, your patient, Kevin Vo exhibits the following, with associated plan:    Partial onset seizure disorder with secondary chills realization. The patient has been seizure-free since 1989. The breakthrough seizure occurred when she was trying to wean from Dilantin. She denied any long-term side effects including gum disease and ataxia, however has been diagnosed with osteopenia. We have had several conversations regarding changing the medication, however the patient has chosen to stay on Dilantin.   Continue Dilantin 200 mg twice daily  Obtain Dilantin level and liver function studies  Continue to return on an annual basis            Signed: Carlos Hernandez, ANITRA      *Please note that portions of this note were completed with a voice recognition program.  Although every effort was made to insure the accuracy of this automated transcription, some errors in transcription may have occurred, occasionally words and are mis-transcribed

## 2023-08-01 ENCOUNTER — TELEPHONE (OUTPATIENT)
Dept: NEUROLOGY | Age: 68
End: 2023-08-01

## 2023-08-03 NOTE — TELEPHONE ENCOUNTER
This is a former patient of Tee Steele, last appointment on 2/8/23. Before seeing Luc Dial (starting 10/16/17) the patient was following with Dr. Ana Echeverria. Call placed to the patient to schedule a follow up to get clearance for upcoming cataract surgery. This was discussed with Venu Lundy. Patient stated that she didn't want to schedule an appointment for the clearance, stating \"they didn't request this I did\". Venu Lundy stated that she didn't want to follow with Dr. Ana Echeverria, that bowen had spoke of another provider. Writer advised that she would need to be seen before any provider would give clearance. Venu Lundy stated that she would call the surgeon's office and then call me back.

## 2024-01-29 DIAGNOSIS — G40.909 SEIZURE DISORDER (HCC): ICD-10-CM

## 2024-01-29 RX ORDER — PHENYTOIN SODIUM 100 MG/1
CAPSULE, EXTENDED RELEASE ORAL
Qty: 360 CAPSULE | Refills: 0 | Status: SHIPPED | OUTPATIENT
Start: 2024-01-29

## 2024-01-29 NOTE — TELEPHONE ENCOUNTER
Pharmacy requesting refill of Dilantin.      Medication active on med list yes      Date of last fill: 2/8/23  verified on 1/29/2024   verified by SF LPN      Date of last appointment 2/8/23 (Norma)    Next Visit Date:  2/14/2024

## 2024-02-14 ENCOUNTER — OFFICE VISIT (OUTPATIENT)
Dept: NEUROLOGY | Age: 69
End: 2024-02-14
Payer: COMMERCIAL

## 2024-02-14 VITALS
BODY MASS INDEX: 33.32 KG/M2 | DIASTOLIC BLOOD PRESSURE: 74 MMHG | HEART RATE: 72 BPM | HEIGHT: 65 IN | SYSTOLIC BLOOD PRESSURE: 116 MMHG | WEIGHT: 200 LBS

## 2024-02-14 DIAGNOSIS — G40.909 SEIZURE DISORDER (HCC): Primary | ICD-10-CM

## 2024-02-14 DIAGNOSIS — M85.80 OSTEOPENIA, UNSPECIFIED LOCATION: ICD-10-CM

## 2024-02-14 PROCEDURE — G8400 PT W/DXA NO RESULTS DOC: HCPCS | Performed by: PHYSICIAN ASSISTANT

## 2024-02-14 PROCEDURE — 3017F COLORECTAL CA SCREEN DOC REV: CPT | Performed by: PHYSICIAN ASSISTANT

## 2024-02-14 PROCEDURE — 1123F ACP DISCUSS/DSCN MKR DOCD: CPT | Performed by: PHYSICIAN ASSISTANT

## 2024-02-14 PROCEDURE — G8427 DOCREV CUR MEDS BY ELIG CLIN: HCPCS | Performed by: PHYSICIAN ASSISTANT

## 2024-02-14 PROCEDURE — G8417 CALC BMI ABV UP PARAM F/U: HCPCS | Performed by: PHYSICIAN ASSISTANT

## 2024-02-14 PROCEDURE — 99214 OFFICE O/P EST MOD 30 MIN: CPT | Performed by: PHYSICIAN ASSISTANT

## 2024-02-14 PROCEDURE — 1036F TOBACCO NON-USER: CPT | Performed by: PHYSICIAN ASSISTANT

## 2024-02-14 PROCEDURE — 1090F PRES/ABSN URINE INCON ASSESS: CPT | Performed by: PHYSICIAN ASSISTANT

## 2024-02-14 PROCEDURE — G8484 FLU IMMUNIZE NO ADMIN: HCPCS | Performed by: PHYSICIAN ASSISTANT

## 2024-02-14 NOTE — PROGRESS NOTES
[address]  [facility] [address]  Dept: [phone]    PATIENT NAME: Elise Mcconnell  PATIENT MRN: [id number]  PRIMARY CARE PHYSICIAN: Mara Beaver DO    HPI:      Elise Mcconnell is a 68 y.o. female who presents to clinic today regarding history of partial onset seizures with secondary generalization. For seizure prevention she receives 200 mg BID. Previously followed with Norma Hernandez CNP.     She has done well since last appointment. She remains on Brand dilantin and cannot tolerate generic products. No concern for confusion, focal weakness, convulsion, staring episodes, tongue-biting, incontinence. She is compliant with medication.     Last DEXA was 2022 through  and she reports it was improved from prior. She is due for follow up scan in the next few months.     Description of previous seizure: witnessed confusion by her children, unsure of generalized convulsions. Unsure of duration. Post ictal confusion and headache for one day. Never tongue-biting or incontinence. 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 Date was 1989.       PREVIOUS WORKUP:     10/13/20 osteopenia bone mineral density by the WHO criteria(using T-scores) measured in the femoral necks    Past Medical History:   Diagnosis Date    Allergic rhinitis 05/26/2015    Cataract     Osteopenia     Poison ivy 05/26/2015    Seizures ([facility]CC)     Vitreous floaters         Past Surgical History:   Procedure Laterality Date    LEG SURGERY  05/16/2020    Left leg surgery- broken leg, has plates and screws    TONSILLECTOMY      TUBAL LIGATION          Social History     Socioeconomic History    Marital status:      Spouse name: Not on file    Number of children: Not on file    Years of education: Not on file    Highest education level: Not on file   Occupational History    Not on file   Tobacco Use    Smoking status: Former     Current packs/day: 0.00     Types: Cigarettes     Quit date: 11/13/1984     Years since quitting:

## 2024-04-05 DIAGNOSIS — G40.909 SEIZURE DISORDER (HCC): ICD-10-CM

## 2024-04-08 RX ORDER — PHENYTOIN SODIUM 100 MG/1
CAPSULE, EXTENDED RELEASE ORAL
Qty: 360 CAPSULE | Refills: 1 | Status: SHIPPED | OUTPATIENT
Start: 2024-04-08

## 2024-04-08 NOTE — TELEPHONE ENCOUNTER
Pharmacy requesting refill of Dilantin 100 mg.      Medication active on med list yes      Date of last Rx: 1/29/2024 with 0 refills          verified by SB, RMA      Date of last appointment 2/14/2024    Next Visit Date:  Visit date not found

## 2024-10-25 DIAGNOSIS — G40.909 SEIZURE DISORDER (HCC): ICD-10-CM

## 2024-10-25 RX ORDER — PHENYTOIN SODIUM 100 MG/1
CAPSULE, EXTENDED RELEASE ORAL
Qty: 360 CAPSULE | Refills: 3 | Status: SHIPPED | OUTPATIENT
Start: 2024-10-25

## 2024-10-25 NOTE — TELEPHONE ENCOUNTER
Pharmacy requesting refill of Dilantin 100 mg.      Medication active on med list yes      Date of last Rx: 4/8/2024 with 1 refills          verified by STEVE PLATT      Date of last appointment 2/14/2024    Next Visit Date:  2/19/2025

## 2025-04-11 ENCOUNTER — OFFICE VISIT (OUTPATIENT)
Age: 70
End: 2025-04-11

## 2025-04-11 VITALS
TEMPERATURE: 98.1 F | DIASTOLIC BLOOD PRESSURE: 63 MMHG | SYSTOLIC BLOOD PRESSURE: 105 MMHG | HEIGHT: 66 IN | WEIGHT: 195 LBS | OXYGEN SATURATION: 93 % | RESPIRATION RATE: 18 BRPM | BODY MASS INDEX: 31.34 KG/M2

## 2025-04-11 DIAGNOSIS — J20.9 ACUTE BRONCHITIS, UNSPECIFIED ORGANISM: Primary | ICD-10-CM

## 2025-04-11 RX ORDER — PREDNISONE 20 MG/1
20 TABLET ORAL DAILY
Qty: 7 TABLET | Refills: 0 | Status: SHIPPED | OUTPATIENT
Start: 2025-04-11 | End: 2025-04-18

## 2025-04-11 RX ORDER — PREDNISONE 20 MG/1
20 TABLET ORAL DAILY
Qty: 7 TABLET | Refills: 0 | Status: SHIPPED | OUTPATIENT
Start: 2025-04-11 | End: 2025-04-11

## 2025-04-11 RX ORDER — BENZONATATE 200 MG/1
200 CAPSULE ORAL 3 TIMES DAILY PRN
Qty: 30 CAPSULE | Refills: 0 | Status: SHIPPED | OUTPATIENT
Start: 2025-04-11 | End: 2025-04-21

## 2025-04-11 RX ORDER — DEXTROMETHORPHAN HYDROBROMIDE, GUAIFENESIN AND PSEUDOEPHEDRINE HYDROCHLORIDE 15; 400; 60 MG/1; MG/1; MG/1
1 TABLET ORAL 3 TIMES DAILY PRN
Qty: 30 TABLET | Refills: 0 | Status: SHIPPED | OUTPATIENT
Start: 2025-04-11 | End: 2025-04-21

## 2025-04-11 RX ORDER — AZITHROMYCIN 250 MG/1
TABLET, FILM COATED ORAL
Qty: 1 PACKET | Refills: 0 | Status: SHIPPED | OUTPATIENT
Start: 2025-04-11

## 2025-04-11 ASSESSMENT — ENCOUNTER SYMPTOMS
COLOR CHANGE: 0
TROUBLE SWALLOWING: 0
CHEST TIGHTNESS: 0
VOICE CHANGE: 0
BACK PAIN: 0
DIARRHEA: 0
COUGH: 1
VOMITING: 0
SORE THROAT: 1
SINUS PAIN: 0
EYE PAIN: 0
EYE REDNESS: 0
ABDOMINAL PAIN: 0
WHEEZING: 0
SWOLLEN GLANDS: 0
RHINORRHEA: 0
SHORTNESS OF BREATH: 0
CONSTIPATION: 0
NAUSEA: 0

## 2025-04-11 NOTE — PROGRESS NOTES
Elise Mcconnell (: 1955) is a 69 y.o. female, New patient, here for evaluation of the following complaint(s): Cold Symptoms      This is a 69 y.o. patient presented to the urgent care today with complaint of productive cough, sore throat for the last 4 days.  She reports yellow sputum.  No exertional dyspnea.  No hemoptysis.  She could not relate any precipitating or alleviating factors.  She had tried over-the-counter cough medication with minimal relief of her symptoms.  Denies any recent travel.  Denies starting any medication.  She has a history of seizures taking Dilantin.  She has been compliant with her medications.  No recent seizure activities.  No fever.  No chest pain or shortness of breath.  No abdominal or back pain.  No nausea or vomiting.  No change in urination or bowel movement.  No neurovascular or motor changes in upper or lower extremities.  No rash. All review of systems are mentioned in the HPI otherwise unremarkable.       History provided by:  Patient  Cold Symptoms   This is a new problem. The current episode started in the past 7 days. The problem has been unchanged. There has been no fever. Associated symptoms include congestion, coughing and a sore throat. Pertinent negatives include no abdominal pain, chest pain, diarrhea, dysuria, ear pain, headaches, joint pain, joint swelling, nausea, neck pain, plugged ear sensation, rash, rhinorrhea, sinus pain, sneezing, swollen glands, vomiting or wheezing.        PAST MEDICAL HISTORY    Past Medical History:   Diagnosis Date    Allergic rhinitis 2015    Cataract     Osteopenia     Poison ivy 2015    Seizures (HCC)     Vitreous floaters        SURGICAL HISTORY    Past Surgical History:   Procedure Laterality Date    LEG SURGERY  2020    Left leg surgery- broken leg, has plates and screws    TONSILLECTOMY      TUBAL LIGATION         CURRENT MEDICATIONS    Current Outpatient Rx   Medication Sig Dispense Refill    DILANTIN

## 2025-06-04 ENCOUNTER — OFFICE VISIT (OUTPATIENT)
Dept: NEUROLOGY | Age: 70
End: 2025-06-04
Payer: COMMERCIAL

## 2025-06-04 VITALS
DIASTOLIC BLOOD PRESSURE: 72 MMHG | HEART RATE: 73 BPM | BODY MASS INDEX: 30.53 KG/M2 | HEIGHT: 66 IN | WEIGHT: 190 LBS | SYSTOLIC BLOOD PRESSURE: 114 MMHG

## 2025-06-04 DIAGNOSIS — G40.909 SEIZURE DISORDER (HCC): Primary | ICD-10-CM

## 2025-06-04 PROCEDURE — 3017F COLORECTAL CA SCREEN DOC REV: CPT | Performed by: PHYSICIAN ASSISTANT

## 2025-06-04 PROCEDURE — 1036F TOBACCO NON-USER: CPT | Performed by: PHYSICIAN ASSISTANT

## 2025-06-04 PROCEDURE — G8427 DOCREV CUR MEDS BY ELIG CLIN: HCPCS | Performed by: PHYSICIAN ASSISTANT

## 2025-06-04 PROCEDURE — 99213 OFFICE O/P EST LOW 20 MIN: CPT | Performed by: PHYSICIAN ASSISTANT

## 2025-06-04 PROCEDURE — G8417 CALC BMI ABV UP PARAM F/U: HCPCS | Performed by: PHYSICIAN ASSISTANT

## 2025-06-04 PROCEDURE — 1123F ACP DISCUSS/DSCN MKR DOCD: CPT | Performed by: PHYSICIAN ASSISTANT

## 2025-06-04 PROCEDURE — G8400 PT W/DXA NO RESULTS DOC: HCPCS | Performed by: PHYSICIAN ASSISTANT

## 2025-06-04 PROCEDURE — 1090F PRES/ABSN URINE INCON ASSESS: CPT | Performed by: PHYSICIAN ASSISTANT

## 2025-06-04 NOTE — PROGRESS NOTES
indicate a positive finding.    Those items not selected are negative.  Constitutional [] Weight loss/gain   [] Fatigue  [] Fever/Chills   HEENT [] Hearing Loss  [] Visual Disturbance  [] Tinnitus  [] Eye pain  [] Vertigo   Respiratory [] Shortness of Breath  [] Cough  [] Snoring   Cardiovascular [] Chest Pain  [] Palpitations  [] Lightheaded   GI [] Constipation  [] Diarrhea  [] Swallowing change  [] Nausea/vomiting    [] Urinary Frequency  [] Urinary Urgency   Musculoskeletal [] Neck pain  [] Back pain  [] Muscle pain  [] Restless legs  [] Joint pain   Dermatologic [] Skin changes   Neurologic [] Memory loss/confusion  [] Seizures  [] Trouble walking or imbalance  [] Dizziness  [] Sleep disturbance  [] Weakness  [] Numbness  [] Tremors  [] Speech Difficulty  [] Headaches  [] Light Sensitivity  [] Sound Sensitivity   Endocrinology []Excessive thirst  []Excessive hunger   Psychiatric [] Anxiety/Depression  [] Hallucination   Allergy/immunology []Hives/environmental allergies   Hematologic/lymph [] Abnormal bleeding  [] Abnormal bruising         NEUROLOGICAL EXAMINATION:   VITALS  /72   Pulse 73   Ht 1.676 m (5' 5.98\")   Wt 86.2 kg (190 lb)   BMI 30.68 kg/m²      General Appearance:  Alert, cooperative, no signs of distress, appears stated age   Head:  Normocephalic, no signs of trauma   Eyes:  Conjunctiva/corneas clear;  eyelids intact   Ears:  Normal external ear    Nose: Nares normal no drainage    Throat: Lips and tongue normal; teeth normal;  normal gingiva   Neck: Supple, intact ROM  trachea midline;     Back:   Symmetric, no curvature, ROM adequate   Lungs:   Respirations unlabored   Heart:  Regular rate    Extremities: Extremities normal, no cyanosis, no edema   Skin: Skin color, texture normal, no rashes, no lesions       Mental status    Alert and oriented x 3; intact memory with no confusion, speech or language problems; no hallucinations or delusions     Cranial nerves    II - visual fields

## 2025-06-24 DIAGNOSIS — G40.909 SEIZURE DISORDER (HCC): ICD-10-CM
